# Patient Record
Sex: MALE | Race: WHITE
[De-identification: names, ages, dates, MRNs, and addresses within clinical notes are randomized per-mention and may not be internally consistent; named-entity substitution may affect disease eponyms.]

---

## 2018-06-25 ENCOUNTER — HOSPITAL ENCOUNTER (EMERGENCY)
Dept: HOSPITAL 18 - NAV ERS | Age: 18
Discharge: HOME | End: 2018-06-25
Payer: COMMERCIAL

## 2018-06-25 DIAGNOSIS — J06.9: Primary | ICD-10-CM

## 2018-06-25 PROCEDURE — 99283 EMERGENCY DEPT VISIT LOW MDM: CPT

## 2020-06-27 ENCOUNTER — HOSPITAL ENCOUNTER (EMERGENCY)
Dept: HOSPITAL 18 - NAV ERS | Age: 20
End: 2020-06-27
Payer: COMMERCIAL

## 2020-06-27 DIAGNOSIS — T63.061A: Primary | ICD-10-CM

## 2020-06-27 DIAGNOSIS — Z87.891: ICD-10-CM

## 2020-06-27 PROCEDURE — 85730 THROMBOPLASTIN TIME PARTIAL: CPT

## 2020-06-27 PROCEDURE — 96365 THER/PROPH/DIAG IV INF INIT: CPT

## 2020-06-27 PROCEDURE — 96375 TX/PRO/DX INJ NEW DRUG ADDON: CPT

## 2020-06-27 PROCEDURE — 82550 ASSAY OF CK (CPK): CPT

## 2020-06-27 PROCEDURE — 85384 FIBRINOGEN ACTIVITY: CPT

## 2020-06-27 PROCEDURE — 80053 COMPREHEN METABOLIC PANEL: CPT

## 2020-06-27 PROCEDURE — 85610 PROTHROMBIN TIME: CPT

## 2020-06-27 PROCEDURE — 85025 COMPLETE CBC W/AUTO DIFF WBC: CPT

## 2020-06-27 PROCEDURE — 94760 N-INVAS EAR/PLS OXIMETRY 1: CPT

## 2020-06-28 ENCOUNTER — HOSPITAL ENCOUNTER (INPATIENT)
Dept: HOSPITAL 92 - ERS | Age: 20
LOS: 1 days | Discharge: HOME | DRG: 918 | End: 2020-06-29
Attending: INTERNAL MEDICINE | Admitting: INTERNAL MEDICINE
Payer: COMMERCIAL

## 2020-06-28 VITALS — BODY MASS INDEX: 33.7 KG/M2

## 2020-06-28 DIAGNOSIS — E66.9: ICD-10-CM
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
## 2020-06-28 DIAGNOSIS — T63.091A: Primary | ICD-10-CM

## 2020-06-28 DIAGNOSIS — R94.5: ICD-10-CM

## 2020-06-28 LAB
ALBUMIN SERPL BCG-MCNC: 4.5 G/DL (ref 3.5–5)
ALP SERPL-CCNC: 80 U/L (ref 40–110)
ALT SERPL W P-5'-P-CCNC: 47 U/L (ref 8–55)
ANION GAP SERPL CALC-SCNC: 18 MMOL/L (ref 10–20)
APTT PPP: 27 SEC (ref 22.9–36.1)
AST SERPL-CCNC: 31 U/L (ref 5–34)
BILIRUB SERPL-MCNC: 1.2 MG/DL (ref 0.2–1.2)
BUN SERPL-MCNC: 9 MG/DL (ref 8.9–20.6)
CALCIUM SERPL-MCNC: 9.8 MG/DL (ref 7.8–10.44)
CHLORIDE SERPL-SCNC: 102 MMOL/L (ref 98–107)
CK SERPL-CCNC: 253 U/L (ref 30–200)
CO2 SERPL-SCNC: 26 MMOL/L (ref 22–29)
CREAT CL PREDICTED SERPL C-G-VRATE: 0 ML/MIN (ref 70–130)
FIBRINOGEN PPP-MCNC: 353 MG/DL (ref 253–463)
GLOBULIN SER CALC-MCNC: 3 G/DL (ref 2.4–3.5)
GLUCOSE SERPL-MCNC: 98 MG/DL (ref 70–105)
HGB BLD-MCNC: 15.6 G/DL (ref 14–18)
INR PPP: 1.1
MANUAL DIFF??: YES
MCH RBC QN AUTO: 29.3 PG (ref 27–31)
MCV RBC AUTO: 93.9 FL (ref 78–98)
MDIFF COMPLETE?: YES
PLATELET # BLD AUTO: 213 THOU/UL (ref 130–400)
POTASSIUM SERPL-SCNC: 3.5 MMOL/L (ref 3.5–5.1)
PROTHROMBIN TIME: 13.8 SEC (ref 12–14.7)
RBC # BLD AUTO: 5.31 MILL/UL (ref 4.7–6.1)
SODIUM SERPL-SCNC: 142 MMOL/L (ref 136–145)
WBC # BLD AUTO: 8.4 THOU/UL (ref 4.8–10.8)

## 2020-06-28 PROCEDURE — 80053 COMPREHEN METABOLIC PANEL: CPT

## 2020-06-28 PROCEDURE — 85384 FIBRINOGEN ACTIVITY: CPT

## 2020-06-28 PROCEDURE — 36415 COLL VENOUS BLD VENIPUNCTURE: CPT

## 2020-06-28 PROCEDURE — 99284 EMERGENCY DEPT VISIT MOD MDM: CPT

## 2020-06-28 PROCEDURE — 85730 THROMBOPLASTIN TIME PARTIAL: CPT

## 2020-06-28 PROCEDURE — 85610 PROTHROMBIN TIME: CPT

## 2020-06-28 PROCEDURE — 85025 COMPLETE CBC W/AUTO DIFF WBC: CPT

## 2020-06-28 RX ADMIN — Medication SCH ML: at 09:52

## 2020-06-28 RX ADMIN — Medication SCH ML: at 20:17

## 2020-06-28 NOTE — CON
DATE OF CONSULTATION:  



We were asked by [facility] to see the patient.  The patient is a healthy

young man who sustained a snake bite to his left lower extremity.  He is working at

a summer camp up at [place] and was just walking through the woods when the snake

bit him.  He does have obvious snake bites.  It has been circled with a pen as was

his swelling and erythema, but looking at his foot right now, it is much better.  He

has some pain in that left lower extremity, but it is tolerable. 



Past medical history, surgical history, family history, medications, allergies can

all be gleaned from the hospitalist note. 



ASSESSMENT:  Snake bite.  The patient feels it was a copperhead.



PLAN:  Again, his redness, swelling are down from where the pin marks were initially

outlined.  He definitely has no compartment syndrome like pain.  He is able to move

that left lower extremity well.  Sensations are intact as are pulses bilaterally.

He has some edema, but again it is improving. 







Job ID:  [id number]

## 2020-06-28 NOTE — RAD
LEFT ANKLE THREE VIEWS:

 

HISTORY:

Snake bite.

 

FINDINGS:

Mild soft tissue swelling at the ankle. No osseous abnormality.

 

IMPRESSION:

No acute osseous abnormality.

 

POS: AGW

## 2020-06-28 NOTE — PDOC.HHP
Hospitalist HPI





- History of Present Illness


Snake bite


History of Present Illness: 





patient is a 19 year old male transferred from [place] for snake bite to [facility] 

last night 11pm, patient reports no significant PMH, the puncture wound is 

visible. demarkated to ~15cm this AM at 3am and has not expanded. no color 

change of skin beyond puncture. given crofab before transfer. coags in [place] 

appear normal. 





Hospitalist ROS





- Review of Systems


Constitutional: denies: fever, chills, sweats, weakness, malaise, other


Eyes: denies: pain, vision change, conjunctivae inflammation, eyelid 

inflammation, redness, other


ENT: denies: ear pain, ear discharge, nose pain, nose discharge, nose congestion

, mouth pain, mouth swelling, throat pain, throat swelling, other


Respiratory: denies: cough, dry, shortness of breath, hemoptysis, SOB with 

excertion, pleuritic pain, sputum, wheezing, other


Cardiovascular: denies: chest pain, palpitations, orthopnea, paroxysmal noc. 

dyspnea, edema, light headedness, other


Gastrointestinal: denies: nausea, vomiting, abdominal pain, diarrhea, 

constipation, melena, hematochezia, other


Genitourinary: denies: dysuria, frequency, incontinence, hematuria, retention, 

other


Musculoskeletal: denies: neck pain, shoulder pain, arm pain, back pain, hand 

pain, leg pain, foot pain, other


Skin: denies: rash, lesions, luis eduardo, bruising, other


Neurological: denies: weakness, numbness, incoordination, change in speech, 

confusion, seizures, other


All other systems reviewed; all pertinent +/- noted in HPI/Subj





Hospitalist History





- Past Medical History


Other Medical History: 





bladder reflux as child





- Past Surgical History


Other Surgical History: 





bladder surgery as child





- Family History


Family History: reports: no pertinent history





- Social History


Smoking Status: Never smoker


Alcohol: reports: None


Drugs: reports: none





- Exam


General Appearance: NAD, awake alert


Eye: PERRL, anicteric sclera


ENT: normocephalic atraumatic, no oropharyngeal lesions, moist mucosa


Neck: supple, symmetric, no JVD, no thyromegaly, no lymphadenopathy, no carotid 

bruit


Heart: RRR, no murmur, no gallops, no rubs, normal peripheral pulses


Respiratory: CTAB, no wheezes, no rales, no ronchi, normal chest expansion, no 

tachypnea, normal percussion


Gastrointestinal: soft, non-tender, non-distended, normal bowel sounds, no 

palpable masses, no hepatomegaly, no splenomegaly, no bruit


Extremities: no cyanosis, no clubbing, no edema


Extremities - other findings: LLE with puncture wound and demarkated minor 

swelling ~15cm on ankle


Skin: normal turgor, no lesions, no rashes


Neurological: cranial nerve grossly intact, normal sensation to touch, no 

weakness, no focal deficits, no new deficit


Musculoskeletal: normal tone, normal strength, no muscle wasting


Psychiatric: normal affect, normal behavior, A&O x 3





Hospitalist Results





- Labs


Additional comment: 





reviewed, see 





Hospitalist H&P A/P





- Plan


Plan: 





# presumed copperhead snake bite


- patient with puncture wound and edema to 15-20cm around the bite, no 

significant coagulopathy, no necrosis or other danger signs, will continue to 

monitor closely and consult orthopedics and handoff to partner to check per 

protocol.


- medications per protocol to be faxed to pharmacy, defer further crofab for 

the moment.

## 2020-06-29 VITALS — SYSTOLIC BLOOD PRESSURE: 107 MMHG | TEMPERATURE: 98.1 F | DIASTOLIC BLOOD PRESSURE: 51 MMHG

## 2020-06-29 LAB
ALBUMIN SERPL BCG-MCNC: 3.7 G/DL (ref 3.5–5)
ALP SERPL-CCNC: 65 U/L (ref 40–110)
ALT SERPL W P-5'-P-CCNC: 44 U/L (ref 8–55)
ANION GAP SERPL CALC-SCNC: 12 MMOL/L (ref 10–20)
APTT PPP: 26.9 SEC (ref 22.9–36.1)
AST SERPL-CCNC: 25 U/L (ref 5–34)
BASOPHILS # BLD AUTO: 0.1 THOU/UL (ref 0–0.2)
BASOPHILS NFR BLD AUTO: 1.4 % (ref 0–1)
BILIRUB SERPL-MCNC: 1.7 MG/DL (ref 0.2–1.2)
BUN SERPL-MCNC: 7 MG/DL (ref 8.9–20.6)
CALCIUM SERPL-MCNC: 8.8 MG/DL (ref 7.8–10.44)
CHLORIDE SERPL-SCNC: 105 MMOL/L (ref 98–107)
CO2 SERPL-SCNC: 27 MMOL/L (ref 22–29)
CREAT CL PREDICTED SERPL C-G-VRATE: 210 ML/MIN (ref 70–130)
EOSINOPHIL # BLD AUTO: 0.2 THOU/UL (ref 0–0.7)
EOSINOPHIL NFR BLD AUTO: 4 % (ref 0–10)
FIBRINOGEN PPP-MCNC: 317 MG/DL (ref 253–463)
GLOBULIN SER CALC-MCNC: 2.4 G/DL (ref 2.4–3.5)
GLUCOSE SERPL-MCNC: 84 MG/DL (ref 70–105)
HGB BLD-MCNC: 14.3 G/DL (ref 14–18)
INR PPP: 1
LYMPHOCYTES # BLD: 2.3 THOU/UL (ref 1.2–3.4)
LYMPHOCYTES NFR BLD AUTO: 40.9 % (ref 21–51)
MCH RBC QN AUTO: 31 PG (ref 27–31)
MCV RBC AUTO: 94.5 FL (ref 78–98)
MONOCYTES # BLD AUTO: 0.4 THOU/UL (ref 0.11–0.59)
MONOCYTES NFR BLD AUTO: 7.9 % (ref 0–10)
NEUTROPHILS # BLD AUTO: 2.5 THOU/UL (ref 1.4–6.5)
NEUTROPHILS NFR BLD AUTO: 45.9 % (ref 42–75)
PLATELET # BLD AUTO: 165 THOU/UL (ref 130–400)
POTASSIUM SERPL-SCNC: 3.7 MMOL/L (ref 3.5–5.1)
PROTHROMBIN TIME: 13.5 SEC (ref 12–14.7)
RBC # BLD AUTO: 4.61 MILL/UL (ref 4.7–6.1)
SODIUM SERPL-SCNC: 140 MMOL/L (ref 136–145)
WBC # BLD AUTO: 5.5 THOU/UL (ref 4.8–10.8)

## 2020-06-29 RX ADMIN — Medication SCH ML: at 08:21

## 2020-06-30 NOTE — DIS
DATE OF ADMISSION:  06/28/2020



DATE OF DISCHARGE:  06/29/2020



DISCHARGE DISPOSITION:  Home.



FOLLOWUP:  

1. Follow up with primary care physician at [facility] in 1 week.

2. Follow up with orthopedic surgeon as needed.

3. Comprehensive metabolic profile after 1 week is recommended.  Primary care

physician advised to follow. 

The patient was seen and examined on the day of discharge.  Denies any new

complaints.  No chest pain, shortness of breath, or palpitations reported.  Left

lower extremity swelling is improving. 



BRIEF HOSPITAL COURSE:  The patient is a 19-year-old male who presented to the

emergency room with a copperhead snake bite to the left ankle.  He had significant

swelling and met the criteria for moderate envenomation.  He was started on CroFab

in the emergency room.  He was monitored on the medical floor.  His ankle x-ray was

negative for acute findings.  He was evaluated by Orthopedic Team as well.  Next

morning, his pain is significantly improved.  The swelling is improving as well.  He

appears stable for discharge.  He was advised to seek medical attention if he

develops any new symptoms. 



FINAL DIAGNOSES:  

1. Copperhead snake bite.

2. Moderate envenomation, status post CroFab.

3. Obesity with a BMI of 33.7.

4. Slightly abnormal LFTs with total bilirubin of 1.7.  Primary care physician

advised to follow. 

5. The patient understands the above plan of care.







Job ID:  [id number]